# Patient Record
Sex: FEMALE | Race: WHITE | Employment: OTHER | URBAN - METROPOLITAN AREA
[De-identification: names, ages, dates, MRNs, and addresses within clinical notes are randomized per-mention and may not be internally consistent; named-entity substitution may affect disease eponyms.]

---

## 2018-11-03 ENCOUNTER — OFFICE VISIT (OUTPATIENT)
Dept: URGENT CARE | Facility: URGENT CARE | Age: 73
End: 2018-11-03
Payer: MEDICARE

## 2018-11-03 VITALS
RESPIRATION RATE: 18 BRPM | TEMPERATURE: 98.4 F | WEIGHT: 163 LBS | OXYGEN SATURATION: 96 % | HEART RATE: 86 BPM | SYSTOLIC BLOOD PRESSURE: 129 MMHG | DIASTOLIC BLOOD PRESSURE: 83 MMHG | BODY MASS INDEX: 26.31 KG/M2

## 2018-11-03 DIAGNOSIS — H61.23 BILATERAL IMPACTED CERUMEN: ICD-10-CM

## 2018-11-03 DIAGNOSIS — J11.1 INFLUENZA-LIKE ILLNESS: Primary | ICD-10-CM

## 2018-11-03 DIAGNOSIS — J02.9 SORE THROAT: ICD-10-CM

## 2018-11-03 LAB
DEPRECATED S PYO AG THROAT QL EIA: NORMAL
FLUAV+FLUBV AG SPEC QL: NEGATIVE
FLUAV+FLUBV AG SPEC QL: NEGATIVE
SPECIMEN SOURCE: NORMAL
SPECIMEN SOURCE: NORMAL

## 2018-11-03 PROCEDURE — 99203 OFFICE O/P NEW LOW 30 MIN: CPT | Mod: 25 | Performed by: PHYSICIAN ASSISTANT

## 2018-11-03 PROCEDURE — 87804 INFLUENZA ASSAY W/OPTIC: CPT | Performed by: PHYSICIAN ASSISTANT

## 2018-11-03 PROCEDURE — 87081 CULTURE SCREEN ONLY: CPT | Performed by: PHYSICIAN ASSISTANT

## 2018-11-03 PROCEDURE — 87880 STREP A ASSAY W/OPTIC: CPT | Performed by: PHYSICIAN ASSISTANT

## 2018-11-03 PROCEDURE — 69210 REMOVE IMPACTED EAR WAX UNI: CPT | Performed by: PHYSICIAN ASSISTANT

## 2018-11-03 ASSESSMENT — ENCOUNTER SYMPTOMS
COUGH: 1
VOMITING: 0
ALLERGIC/IMMUNOLOGIC NEGATIVE: 1
NECK STIFFNESS: 0
HEADACHES: 0
SORE THROAT: 1
DIZZINESS: 0
WOUND: 0
LIGHT-HEADEDNESS: 0
CHILLS: 0
DIARRHEA: 0
CARDIOVASCULAR NEGATIVE: 1
WEAKNESS: 0
NECK PAIN: 0
JOINT SWELLING: 0
RHINORRHEA: 0
MYALGIAS: 0
FEVER: 1
PALPITATIONS: 0
SHORTNESS OF BREATH: 0
NAUSEA: 0
ENDOCRINE NEGATIVE: 1
HEMATOLOGIC/LYMPHATIC NEGATIVE: 1
BACK PAIN: 0
EYES NEGATIVE: 1
BRUISES/BLEEDS EASILY: 0
ARTHRALGIAS: 0
MUSCULOSKELETAL NEGATIVE: 1

## 2018-11-03 NOTE — PROGRESS NOTES
Chief Complaint:     Chief Complaint   Patient presents with     URI     pt states she was exposed to croup, not feeling well       HPI: Celsa Casas is an 72 year old female who presents with dry cough, sore throat and fevers. It began  5 day(s) ago and has unchanged.  Cough is dry, occasional There is no shortness of breath, wheezing and chest pain.      Recent travel?  no.    Patient is new to [place].    ROS:     Review of Systems   Constitutional: Positive for fever. Negative for chills.   HENT: Positive for sore throat. Negative for congestion, ear pain and rhinorrhea.    Eyes: Negative.    Respiratory: Positive for cough. Negative for shortness of breath.    Cardiovascular: Negative.  Negative for chest pain and palpitations.   Gastrointestinal: Negative for diarrhea, nausea and vomiting.   Endocrine: Negative.    Genitourinary: Negative.    Musculoskeletal: Negative.  Negative for arthralgias, back pain, joint swelling, myalgias, neck pain and neck stiffness.   Skin: Negative.  Negative for rash and wound.   Allergic/Immunologic: Negative.  Negative for immunocompromised state.   Neurological: Negative for dizziness, weakness, light-headedness and headaches.   Hematological: Negative.  Does not bruise/bleed easily.     No pertinent family or medical Hx at this time.  Patient is a former smoker..  No pertinent surgical Hx at this time.       Respiratory History  occasional episodes of bronchitis       Family History   History reviewed. No pertinent family history.     Problem history  Patient Active Problem List   Diagnosis     Hypertension     Hypercholesterolemia        Allergies  No Known Allergies     Social History  Social History     Social History     Marital status: Single     Spouse name: N/A     Number of children: N/A     Years of education: N/A     Occupational History     Not on file.     Social History Main Topics     Smoking status: Former Smoker     Smokeless tobacco: Never Used      Comment:  quit 28 years ago     Alcohol use Yes     Drug use: No     Sexual activity: Not on file     Other Topics Concern     Not on file     Social History Narrative        Current Meds    Current Outpatient Prescriptions:      ASPIRIN 81 MG PO CHEW, 1 TABLET DAILY, Disp: , Rfl:      LISINOPRIL-HYDROCHLOROTHIAZIDE 10-12.5 MG PO TABS, 1 TABLET DAILY, Disp: , Rfl:      LOVASTATIN 40 MG PO TABS, 1 TABLET DAILY AT DINNER, Disp: , Rfl:         OBJECTIVE     Vital signs reviewed by Kyle Foster  /83 (BP Location: Left arm, Patient Position: Chair, Cuff Size: Adult Regular)  Pulse 86  Temp 98.4  F (36.9  C) (Oral)  Resp 18  Wt 163 lb (73.9 kg)  SpO2 96%  BMI 26.31 kg/m2     Physical Exam   Constitutional: She is oriented to person, place, and time. She appears well-developed and well-nourished. No distress.   HENT:   Head: Normocephalic and atraumatic.   Right Ear: Tympanic membrane, external ear and ear canal normal. No drainage. Tympanic membrane is not erythematous, not retracted and not bulging.   Left Ear: Tympanic membrane, external ear and ear canal normal. No drainage. Tympanic membrane is not erythematous, not retracted and not bulging.   Mouth/Throat: Posterior oropharyngeal erythema present. No oropharyngeal exudate, posterior oropharyngeal edema or tonsillar abscesses. Tonsils are 0 on the right. Tonsils are 0 on the left. No tonsillar exudate.   Bilateral ears lavaged and cerumen removed with curette.     Eyes: EOM are normal. Pupils are equal, round, and reactive to light.   Neck: Trachea normal, normal range of motion and full passive range of motion without pain. Neck supple.   Cardiovascular: Normal rate, regular rhythm, S1 normal, S2 normal, normal heart sounds and intact distal pulses.  Exam reveals no gallop and no friction rub.    No murmur heard.  Pulmonary/Chest: Effort normal and breath sounds normal. No respiratory distress. She has no decreased breath sounds. She has no wheezes. She has no  rhonchi. She has no rales.   Abdominal: Soft. Bowel sounds are normal. She exhibits no distension and no mass. There is no hepatosplenomegaly. There is no tenderness. There is no rigidity, no rebound, no guarding and no CVA tenderness.   Lymphadenopathy:     She has no cervical adenopathy.   Neurological: She is alert and oriented to person, place, and time. She has normal reflexes. No cranial nerve deficit.   Skin: Skin is warm and dry. She is not diaphoretic.   Psychiatric: She has a normal mood and affect. Her behavior is normal. Judgment and thought content normal.   Nursing note and vitals reviewed.        Labs:     Results for orders placed or performed in visit on 11/03/18   Strep, Rapid Screen   Result Value Ref Range    Specimen Description Throat     Rapid Strep A Screen       NEGATIVE: No Group A streptococcal antigen detected by immunoassay, await culture report.   Influenza A/B antigen   Result Value Ref Range    Influenza A/B Agn Specimen Nasopharyngeal     Influenza A Negative NEG^Negative    Influenza B Negative NEG^Negative       Medical Decision Making:    Differential Diagnosis:  URI Adult/Peds:  Bronchitis-viral, Influenza, Pneumonia, Strep pharyngitis, Viral pharyngitis, Viral syndrome and Viral upper respiratory illness        ASSESSMENT    1. Influenza-like illness    2. Sore throat    3. Bilateral impacted cerumen        PLAN  Patient presents with 5 days of cough and sore throat.  She is afebrile.  She is in no acute distress.  Lung sounds are clear and O2 sats at 96% on RA.  Imaging is not indicated.  Bilateral ears lavaged and cerumen removed with curette by me.  RST was negative.  We will call with culture results only if positive.   Influenza was negative  Rest, Push fluids, vaporizer, elevation of head of bed.  Ibuprofen and or Tylenol for any fever or body aches.  Over the counter cough suppressant- PRN- as discussed.   If symptoms worsen, recheck immediately otherwise follow up with  your PCP in 1 week if symptoms are not improving.  Worrisome symptoms discussed with instructions to go to the ED.  Patient verbalized understanding and agreed with this plan.         Kyle Foster  11/3/2018, 9:58 AM

## 2018-11-03 NOTE — MR AVS SNAPSHOT
"              After Visit Summary   11/3/2018    Celsa Casas    MRN: [id number]           Patient Information     Date Of Birth          1945        Visit Information        Provider Department      11/3/2018 9:50 AM Kyle Foster PA-C [facility]        Today's Diagnoses     Influenza-like illness    -  1    Sore throat        Bilateral impacted cerumen           Follow-ups after your visit        Who to contact     If you have questions or need follow up information about today's clinic visit or your schedule please contact [facility] directly at [phone].  Normal or non-critical lab and imaging results will be communicated to you by [employer], letter or phone within 4 business days after the clinic has received the results. If you do not hear from us within 7 days, please contact the clinic through [employer] or phone. If you have a critical or abnormal lab result, we will notify you by phone as soon as possible.  Submit refill requests through [facility] or call your pharmacy and they will forward the refill request to us. Please allow 3 business days for your refill to be completed.          Additional Information About Your Visit        MyChart Information     [facility] lets you send messages to your doctor, view your test results, renew your prescriptions, schedule appointments and more. To sign up, go to [email or web address] . Click on \"Log in\" on the left side of the screen, which will take you to the Welcome page. Then click on \"Sign up Now\" on the right side of the page.     You will be asked to enter the access code listed below, as well as some personal information. Please follow the directions to create your username and password.     Your access code is: [email or web address]  Expires: 2019 10:49 AM     Your access code will  in 90 days. If you need help or a new code, please call your [facility] or [phone].        Care EveryWhere ID     This " is your Care EveryWhere ID. This could be used by other organizations to access your [facility] medical records  [id number]        Your Vitals Were     Pulse Temperature Respirations Pulse Oximetry BMI (Body Mass Index)       86 98.4  F (36.9  C) (Oral) 18 96% 26.31 kg/m2        Blood Pressure from Last 3 Encounters:   11/03/18 129/83   06/25/15 136/78   06/20/10 132/94    Weight from Last 3 Encounters:   11/03/18 163 lb (73.9 kg)   06/25/15 161 lb (73 kg)   06/20/10 159 lb (72.1 kg)              We Performed the Following     Beta strep group A culture     Influenza A/B antigen     REMOVE IMPACTED CERUMEN     Strep, Rapid Screen        Primary Care Provider Fax #    Physician No Ref-Primary [phone]       No address on file        Equal Access to Services     KALANI TAYLOR : Crow Rizvi, wabri zimmer, rogerio lopez, rey reardon . So [facility] [phone].    ATENCIÓN: Si habla español, tiene a barros disposición servicios gratuitos de asistencia lingüística. Tod al [phone].    We comply with applicable federal civil rights laws and [place] laws. We do not discriminate on the basis of race, color, national origin, age, disability, sex, sexual orientation, or gender identity.            Thank you!     Thank you for choosing [facility]  for your care. Our goal is always to provide you with excellent care. Hearing back from our patients is one way we can continue to improve our services. Please take a few minutes to complete the written survey that you may receive in the mail after your visit with us. Thank you!             Your Updated Medication List - Protect others around you: Learn how to safely use, store and throw away your medicines at www.disposemymeds.org.          This list is accurate as of 11/3/18 10:49 AM.  Always use your most recent med list.                   Brand Name Dispense Instructions for use Diagnosis     aspirin 81 MG chewable tablet      1 TABLET DAILY        lisinopril-hydrochlorothiazide 10-12.5 MG per tablet    PRINZIDE/ZESTORETIC     1 TABLET DAILY        lovastatin 40 MG tablet    MEVACOR     1 TABLET DAILY AT DINNER

## 2018-11-04 LAB
BACTERIA SPEC CULT: NORMAL
SPECIMEN SOURCE: NORMAL

## 2019-12-17 ENCOUNTER — OFFICE VISIT (OUTPATIENT)
Dept: URGENT CARE | Facility: URGENT CARE | Age: 74
End: 2019-12-17
Payer: MEDICARE

## 2019-12-17 VITALS
BODY MASS INDEX: 27.5 KG/M2 | TEMPERATURE: 99.4 F | HEART RATE: 92 BPM | OXYGEN SATURATION: 93 % | SYSTOLIC BLOOD PRESSURE: 112 MMHG | DIASTOLIC BLOOD PRESSURE: 69 MMHG | RESPIRATION RATE: 20 BRPM | WEIGHT: 170.4 LBS

## 2019-12-17 DIAGNOSIS — R50.9 FEVER, UNSPECIFIED FEVER CAUSE: ICD-10-CM

## 2019-12-17 DIAGNOSIS — J01.00 ACUTE NON-RECURRENT MAXILLARY SINUSITIS: ICD-10-CM

## 2019-12-17 DIAGNOSIS — R05.9 COUGH: Primary | ICD-10-CM

## 2019-12-17 LAB
BASOPHILS # BLD AUTO: 0 10E9/L (ref 0–0.2)
BASOPHILS NFR BLD AUTO: 0.2 %
DIFFERENTIAL METHOD BLD: ABNORMAL
EOSINOPHIL # BLD AUTO: 0.1 10E9/L (ref 0–0.7)
EOSINOPHIL NFR BLD AUTO: 0.7 %
FLUAV+FLUBV AG SPEC QL: NEGATIVE
FLUAV+FLUBV AG SPEC QL: NEGATIVE
LYMPHOCYTES # BLD AUTO: 1.9 10E9/L (ref 0.8–5.3)
LYMPHOCYTES NFR BLD AUTO: 15.8 %
MONOCYTES # BLD AUTO: 1.2 10E9/L (ref 0–1.3)
MONOCYTES NFR BLD AUTO: 9.8 %
NEUTROPHILS # BLD AUTO: 8.8 10E9/L (ref 1.6–8.3)
NEUTROPHILS NFR BLD AUTO: 73.5 %
SPECIMEN SOURCE: NORMAL
WBC # BLD AUTO: 12 10E9/L (ref 4–11)

## 2019-12-17 PROCEDURE — 85048 AUTOMATED LEUKOCYTE COUNT: CPT | Mod: 59 | Performed by: NURSE PRACTITIONER

## 2019-12-17 PROCEDURE — 85004 AUTOMATED DIFF WBC COUNT: CPT | Performed by: NURSE PRACTITIONER

## 2019-12-17 PROCEDURE — 87804 INFLUENZA ASSAY W/OPTIC: CPT | Performed by: NURSE PRACTITIONER

## 2019-12-17 PROCEDURE — 99213 OFFICE O/P EST LOW 20 MIN: CPT | Performed by: NURSE PRACTITIONER

## 2019-12-17 PROCEDURE — 36415 COLL VENOUS BLD VENIPUNCTURE: CPT | Performed by: NURSE PRACTITIONER

## 2019-12-17 RX ORDER — AZITHROMYCIN 250 MG/1
TABLET, FILM COATED ORAL
Qty: 6 TABLET | Refills: 0 | Status: SHIPPED | OUTPATIENT
Start: 2019-12-17 | End: 2019-12-22

## 2019-12-17 ASSESSMENT — ENCOUNTER SYMPTOMS
NAUSEA: 0
RHINORRHEA: 1
FEVER: 1
WHEEZING: 0
LIGHT-HEADEDNESS: 1
HEADACHES: 1
VOMITING: 0
SHORTNESS OF BREATH: 0
SORE THROAT: 1
APPETITE CHANGE: 1
MYALGIAS: 1
DIARRHEA: 0
COUGH: 1

## 2019-12-17 NOTE — PROGRESS NOTES
SUBJECTIVE:   Celsa Casas is a 74 year old female presenting with a chief complaint of   Chief Complaint   Patient presents with     URI     Cold symptoms     Fever     x4 days     Cough       She is an established patient of [facility].    URI Adult    Onset of symptoms was 4 day(s) ago.  Course of illness is worsening.    Severity moderate  Current and Associated symptoms: fever, chills, stuffy nose, cough - non-productive, sore throat, headache and body aches  Treatment measures tried include Tylenol/Ibuprofen.  Predisposing factors include ill contact: Family member .    Review of Systems   Constitutional: Positive for appetite change and fever.   HENT: Positive for congestion, rhinorrhea and sore throat. Negative for ear pain.    Eyes: Negative for visual disturbance.   Respiratory: Positive for cough. Negative for shortness of breath and wheezing.    Gastrointestinal: Negative for diarrhea, nausea and vomiting.   Musculoskeletal: Positive for myalgias.   Skin: Negative for rash.   Neurological: Positive for light-headedness and headaches.       Past Medical History:   Diagnosis Date     Hypercholesteraemia      Hypertension      History reviewed. No pertinent family history.  Current Outpatient Medications   Medication Sig Dispense Refill     azithromycin (ZITHROMAX) 250 MG tablet Take 2 tablets (500 mg) by mouth daily for 1 day, THEN 1 tablet (250 mg) daily for 4 days. 6 tablet 0     LISINOPRIL-HYDROCHLOROTHIAZIDE 10-12.5 MG PO TABS 1 TABLET DAILY       LOVASTATIN 40 MG PO TABS 1 TABLET DAILY AT DINNER       ASPIRIN 81 MG PO CHEW 1 TABLET DAILY       Social History     Tobacco Use     Smoking status: Former Smoker     Smokeless tobacco: Never Used     Tobacco comment: quit 28 years ago   Substance Use Topics     Alcohol use: Yes       OBJECTIVE  /69   Pulse 92   Temp 99.4  F (37.4  C) (Oral)   Resp 20   Wt 77.3 kg (170 lb 6.4 oz)   SpO2 93%   BMI 27.50 kg/m      Physical Exam  Vitals signs and  nursing note reviewed.   Constitutional:       Appearance: She is ill-appearing.      Comments: Patient dressed in layers, shivering.    HENT:      Head: Normocephalic and atraumatic.      Right Ear: Ear canal and external ear normal. A middle ear effusion is present.      Left Ear: Ear canal and external ear normal. A middle ear effusion is present.      Nose: Congestion and rhinorrhea present.      Right Sinus: Maxillary sinus tenderness present. No frontal sinus tenderness.      Left Sinus: Maxillary sinus tenderness present. No frontal sinus tenderness.      Mouth/Throat:      Pharynx: Posterior oropharyngeal erythema present. No oropharyngeal exudate.   Eyes:      Extraocular Movements: Extraocular movements intact.      Conjunctiva/sclera: Conjunctivae normal.      Pupils: Pupils are equal, round, and reactive to light.   Neck:      Musculoskeletal: Normal range of motion and neck supple.   Cardiovascular:      Rate and Rhythm: Normal rate and regular rhythm.      Heart sounds: Normal heart sounds.   Pulmonary:      Effort: Pulmonary effort is normal. Tachypnea present.      Breath sounds: Normal breath sounds and air entry.   Lymphadenopathy:      Head:      Right side of head: No submandibular or tonsillar adenopathy.      Left side of head: No submandibular or tonsillar adenopathy.      Cervical: No cervical adenopathy.   Skin:     General: Skin is warm and dry.   Neurological:      Mental Status: She is alert and oriented to person, place, and time.   Psychiatric:         Behavior: Behavior is cooperative.         Labs:  Results for orders placed or performed in visit on 12/17/19 (from the past 24 hour(s))   Influenza A/B antigen   Result Value Ref Range    Influenza A/B Agn Specimen Nasal     Influenza A Negative NEG^Negative    Influenza B Negative NEG^Negative   WBC with Diff   Result Value Ref Range    WBC 12.0 (H) 4.0 - 11.0 10e9/L    % Neutrophils 73.5 %    % Lymphocytes 15.8 %    % Monocytes 9.8 %     % Eosinophils 0.7 %    % Basophils 0.2 %    Absolute Neutrophil 8.8 (H) 1.6 - 8.3 10e9/L    Absolute Lymphocytes 1.9 0.8 - 5.3 10e9/L    Absolute Monocytes 1.2 0.0 - 1.3 10e9/L    Absolute Eosinophils 0.1 0.0 - 0.7 10e9/L    Absolute Basophils 0.0 0.0 - 0.2 10e9/L    Diff Method Automated Method        X-Ray was not done.    ASSESSMENT:      ICD-10-CM    1. Cough R05 Influenza A/B antigen     WBC with Diff     azithromycin (ZITHROMAX) 250 MG tablet   2. Fever, unspecified fever cause R50.9 Influenza A/B antigen     WBC with Diff   3. Acute non-recurrent maxillary sinusitis J01.00 azithromycin (ZITHROMAX) 250 MG tablet        Medical Decision Making:    Differential Diagnosis:  URI Adult/Peds:  Bronchitis-viral, Influenza, Laryngitis, Pneumonia, Sinusitis, Strep pharyngitis, Viral pharyngitis and Viral upper respiratory illness    Serious Comorbid Conditions:  Adult:  None    PLAN:  Discussed with patient that influenza was negative. WBC was mildly elevated. Will start her on zithromax. Additional symptomatic treatment recommendations discussed. Education was added to AVS. Patient was agreeable to plan and verbalized understanding.     Followup:    If not improving in 5-7 days  or if condition worsens, follow up with your Primary Care Provider    Patient Instructions   zithromax as prescribed.  Push Fluids  Plenty of rest  Tylenol and ibuprofen to help with pain or fever  Humidified air can help with congestion  Nasal saline or Flonase nasal spray to help with congestion  Salt water gargles, anesthetic throat spray, or lozenges to help with sore throat.

## 2019-12-17 NOTE — PATIENT INSTRUCTIONS
zithromax as prescribed.  Push Fluids  Plenty of rest  Tylenol and ibuprofen to help with pain or fever  Humidified air can help with congestion  Nasal saline or Flonase nasal spray to help with congestion  Salt water gargles, anesthetic throat spray, or lozenges to help with sore throat.